# Patient Record
Sex: FEMALE | Employment: FULL TIME | URBAN - METROPOLITAN AREA
[De-identification: names, ages, dates, MRNs, and addresses within clinical notes are randomized per-mention and may not be internally consistent; named-entity substitution may affect disease eponyms.]

---

## 2022-02-24 ENCOUNTER — HOSPITAL ENCOUNTER (OUTPATIENT)
Dept: PHYSICAL THERAPY | Age: 57
Discharge: HOME | End: 2022-02-24
Payer: COMMERCIAL

## 2022-02-24 PROCEDURE — 92507 TX SP LANG VOICE COMM INDIV: CPT

## 2022-02-24 PROCEDURE — 92524 BEHAVRAL QUALIT ANALYS VOICE: CPT

## 2022-02-24 NOTE — PROGRESS NOTES
[facility] [employer] [facility]  [address] [place] [address]  [phone] fax    Plan of Care/ Statement of Necessity for Speech Therapy Services    Patient name: Gris Carver Start of Care: 2022   Referral source: Maribel Meadows MD : 1965    Medical Diagnosis: Dysphonia [R49.0]  Payor: Salvador Watkins / Plan: [employer] / Product Type: HMO /  Onset Date:2021    Treatment Diagnosis: Dysphonia   Prior Hospitalization: see medical history Provider#: [id number]   Medications: Verified on Patient summary List    Comorbidities: Right TVC nodule, hx of hyper/hypothyroidism, GERD, allergies   Prior Level of Function: WNL for speech, language, voice, swallowing, and cognition    The Plan of Care and following information is based on the information from the initial evaluation. Assessment/ key information:     Patient seen this AM for voice evaluation in OP clinic following recent referral from ENT following new diagnosis of vocal hoarseness with right TVC nodule, reflux, and allergies. Patient reports that she started having increased hoarsenss in [place] that gradually worsened. 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 She is also very involved in the community in [employer] and her college sorority. Patient reports hoarseness is worse in AM, during changes in weather, and s/p long conversations. Saw an ENT 21, referred to ST during that time. Patient diagnosed with GERD and nodule on her right TVC. ENT placed patient on reflux medication and Flonase during that time, patient has noticed decline in hoarseness s/p initiation of reflux medication. Patient reports that the Flonase was causing her nose to bleed/feel dry, so she has decreased how often she is using it. Patient was evaluated today through informal and formalized assessment with results as followed.  Patient alert and oriented x4 romero. Intermittent hoarseness noted throughout conversation at the end of long utterances. Patient noted to have reduced breathing/speaking coordination, patient observed speaking beyond breath during conversation, resulting in strained/hoarse/breathy vocal quality during longer conversational utterances. Reduced breath support noted during sustained phonation task. Patient was able to sustain \"ah\" for 4.5 seconds on average (too low for her age/gender, norm is 15-20 seconds) with pitch breaks noted at the end of the utterances and laryngeal tension evident. Patient's s/z ratio was assessed as a clinical indicator of laryngeal pathology. Patient's ratio was 1.94, ratios of 1.4 and above are consistent with laryngeal inefficiency and ratios of 2.0 and greater of vocal fold pathology. Patient's pitch range was formally assessed to be 6 semi-tones (too low), but informally assessed to be within normal limits during conversation. Her modal pitch during conversation was 223.08 Hz and during oral reading was 196.0 Hz. Patient with intermittent increased volume noted across evaluation, patient reports that her  notes that she \"talks loud frequently\" at home. Equipment unavailable for hearing screening during today's evaluation, patient may benefit from having her hearing formally assessed. Patient with frequent throat clearing noted throughout evaluation, would benefit from implementation of vocal hygiene strategies to decrease trauma and increase vocal health.     Consensus Auditory Perceptual Evaluation of Voice (CAPE-V) was administered by this SLP :On a scale of 0 to 100 with 100 equaling the most extreme deviance she attained the following scores: (see attached in paper chart):  Overall Severity: 15/100 inconsistent; mildly deviant, roughness: 5/100 inconsistent mildly deviant , breathiness 6/100 Inconsistent mildly deviant; strain: 17/100 consistent, mildly deviant; pitch (WNL) 0/100consistent; moderately deviant; loudness: (too loud)  17/100 inconsistent; mild-moderately deviant      Patient completed Voice Handicap Index (VHI): (see attached in her paper chart): She score agreement or disagreement for statements that many people have used to describe their voices and the effects of their voices on their lives. It is self scored with score choices 0 through 4. Zero indicates 'never handicapping' and 4 indicating 'always handicapping'. The higher the score the more handicapping. There are 40 points possible for each of the 3 sections and also a total handicapping score of 120 points. For functional she attained a score of 7 points (mildly handicapping); for physical she scored 25 points (severely handicapping); and for emotional she scored 15 points (moderately severely handicapping). She attained a total score of 47 points (moderately  severely handicapping). She rated herself a score of 6  on a 7 point scale ( extremely talkative).     She completed LPR Symptoms Index: See attached in chart. (0= no problem 5=severe problem) She rated the following as 3: hoarseness, clearing your throat. She rated the following as 2: excessive mucous/postnasal drip, sensations of something sticking in throat. She rated the following as 1: difficulty swallowing, breathing difficulty, and heartburn/chest pain/indigestion/stomach acid coming up. From the results indicated above, patient currently presents with a mild dysphonia as c/b intermittent hoarseness, reduced breath support, increased laryngeal tension, and vocal fatigue. Patient would benefit from skilled ST services for dysphonia treatment as it is medically necessary to improve vocal function, reduce laryngeal trauma, increase coordination between breathing/speaking, and increase participation in functional social and work requirements.       Problem List:      []aphasic  []dysarthric  []dysphagic       []alexic  []agraphic  [x]dysphonia       []dysfluency []Cognitive-Linguistic Disorder       []other   Treatment Plan may include any combination of the following: Voice Treatment and Patient Education      Patient / Family readiness to learn indicated by: asking questions, trying to perform skills and interest    Persons(s) to be included in education:   patient (P) and family support person (FSP);list Dr. Ambrocio Woodson (PCP), Dr. Valeriy Ohara (ENT)    Barriers to Learning/Limitations: None    Patient Goal (s): To decrease my hoarseness and reduce my habits that are causing it    Patient Self Reported Health Status: good    Rehabilitation Potential: excellent    Short Term Goals: To be accomplished in 4 weeks  1. Patient will demonstrate good vocal hygiene by  (a) decreasing coughing by ~50% (b) decreasing throat clearing by 50% (c) consume ~ 64 oz of water daily and (d) taking her PPI correctly for best absorption in order to decrease trauma/irritation to the vocal folds to help improve her voice.     2. Patient will use diaphragmatic/abdominal breath management with good connection of airflow to phonation for automatics, words, phrases and simple sentences with 80% acc with min-mod cues during structured phonation/speaking tasks to improve breath support.     3. Patient will perform laryngeal area relaxation and stretching exercises with min cues to reduce vocal tension and carryover for consistent  HEP completion.      4. Patient will demonstrate a more forward placement of tone, easy onsets of phonation and a legato speaking style with adequate loudness (lower), and decreased tension for automatics, words, phrases and short sentences with 70% acc with min-mod cues in order to improve her voice.       5. .  Patient will perform vocal fx exercises (such as Stemple's, vocal entrainment-pitch ex's) with mod A in order to improve the balance between respiration, phonation and resonance and improve vocal strength and flexibility for improved vocal health and voice production    Long Term Goals: To be accomplished in 4 weeks   1. Patient will demonstrate/produce improved voice for sustained vocalization/ connective speech up to the simple conversational level to communicate basic medical and social needs, including decreased tension, increased facial focus, and easier voice and increased vocal stamina with a more appropriate pitch (lower), loudness, and vocal quality 80% of the time for vocal ADL/IADL tasks as measured by objective measurements, SLP assessment and SLP/patient agreement for perceptual judgment. Frequency / Duration: Patient to be seen 1-2 times per week for 4 weeks:    Patient/ Caregiver education and instruction: Diagnosis, prognosis, Compensatory Techniques and Exercises,  Tx initiated to include training in diaphragmatic breathing and vocal hygiene, patient reported understanding    Certification Period: 02/24/22- 03/26/22    KRUNAL Long 2/24/2022 8:12 AM  ________________________________________________________________________    I certify that the above Therapy Services are being furnished while the patient is under my care. I agree with the treatment plan and certify that this therapy is necessary.     [de-identified] Signature:____________Date:_________TIME:________     Jesus Rogers MD  ** Signature, Date and Time must be completed for valid certification **    Please sign and return to [facility]  [address] [place] [address]  [phone] fax     Thank you

## 2022-02-24 NOTE — PROGRESS NOTES
[facility] EVALUATION    Patient Name: Kim Monzon  Date:2022  : 1965  [x]  Patient  Verified  Payor: Jeff Miller / Plan: Erika Tanner / Product Type: HMO /   In time: 900  Out time: 945  Total Treatment Time (min): 45  Visit #: 1 of 8    SUBJECTIVE  Pain Level (0-10 scale): 0  Any medication changes, allergies to medications, adverse drug reactions, diagnosis change, or new procedure performed?: [] No    [] Yes (see summary sheet for update)  Subjective functional status/changes:   [] No changes reported  Patient reports that she started having increased hoarsenss in [place] that gradually worsened. 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 Patient reports hoarseness is worse in AM, during changes in weather, and s/p long conversations. Saw an ENT 21, referred to ST during that time. Patient diagnosed with GERD and nodule on her right TVC. ENT placed patient on reflux medication and Flonase during that time, patient has noticed decline in hoarseness s/p initiation of reflux medication. Patient reports that the Flonase was causing her nose to bleed/feel dry, so she has decreased how often she is using it. Date of Onset: 2021  Social History: Patient talks a lot for her job. Patient lives at home with . Involved in her [employer], post-grad program weekly. Involved in her [employer], speaks occasionally in her [employer].   Prior Functional level: WNL for voice, swallowing, cognition, and language     OBJECTIVE    OUTPATIENT VOICE EVALUATION    Description of Problem: Ongoing hoarseness    Onset of Problem: 2021  Variability through Day: yes, worse in AM and following work hours, with change in weather as well  Voice Usage: work, socially, with family    Know Abuse/Misuse: frequent throat clearing    Pitch:   [x] WNL  [] Low  [] High     Comments:  Loudness: [] WNL  [x] Excessive [] Inadequate     Comments: Occasionally increased volume noted at the simple conversational level  Quality:  [] WNL      Comments: Intermittent hoarseness noted  Resonance: [x] WNL  [] Hypernasal [] Hyponasal     Comments:  Rate:  [] WNL  [x] Fast  [] Slow     Comments: Intermittent fast rate of speaking with reduced breathing/speaking coordination noted, speaking beyond breath  Range:  [x] WNL  [] Montone [] Excessive variability    Comments:  Observations [x] Pitch Breaks [] Glottal Larkin [] Stridency [] Hard Glottal Attacks    [] Aphonia [] Diplophonia [x] Phonation Breaks     [] Severe Fluctuations on Quality    []Other:     [] Aphonia interspersed with intermittent phonation      Vowel prolongation /a/ (a measure of respiratory/phonatory efficiency):  duration:  4.5  seconds;  pitch: 261.63 Hz, Intensity: 74  dB   Vocal quality:  Pitch breaks at the end of the production, vocal strain noted        Excessive tension observed in the neck during the prolongation      S/Z Ratio: S= 6.2 sec  Z= 3.2 sec  S/Z= 1.94    Frequency (as measured by chromatic tuner and keyboard):  Pitch Range:   6 Semi-tones, informally assessed to be WNL, patient with difficulty providing highest and lowest pitch      Lowest: 392 Hz   Highest: 554.37 Hz   Modal pitch in conversation: 223.08 Hz  Modal pitch in readin.0    Is breathing audible? [] Yes [x] No  Is phonation on inhalation? [] Yes [x] No  Is breathing pattern irregular? [] Yes [x] No  Does patient have difficulty sustaining expiration? [] Yes [x] No  Does patient focus on breathing? [] Yes [x] No  Clavicular breathing? [] Yes [x] No  Reduced respiratory/speech coordination? [x] Yes [] No    Frequent coughing? [] Yes [x] No   Frequent throat clearing? [x] Yes [] No   Extrinsic laryngeal tension? [x] Yes [] No  Visible tension present: []neck  [] chest  [] mandible  Evidence of tight throat during phonation? [] Yes [] No  Complaints of tired throat?  [x] Yes [] No  Tone focus: []retracted tongue  [] closed mouth   []appropriate [x] back throat focus    Perceptual assessment:  Consensus Auditory Perceptual Evaluation of Voice (CAPE-V) was administered by this SLP :On a scale of 0 to 100 with 100 equaling the most extreme deviance she attained the following scores: (see attached in paper chart):  Overall Severity: 15/100 inconsistent; mildly deviant, roughness: 5/100 inconsistent mildly deviant , breathiness 6/100 Inconsistent mildly deviant; strain: 17/100 consistent, mildly deviant; pitch (WNL) 0/100consistent;  moderately deviant; loudness: (too loud)  17/100 inconsistent; mild-moderately deviant      Patient completed Voice Handicap Index (VHI): (see attached in her paper chart): She score agreement or disagreement for statements that many people have used to describe their voices and the effects of their voices on their lives. It is self scored with score choices 0 through 4. Zero indicates 'never handicapping' and 4 indicating 'always handicapping'. The higher the score the more handicapping. There are 40 points possible for each of the 3 sections and also a total handicapping score of 120 points. For functional she attained a score of 7 points (mildly handicapping); for physical she scored 25 points (severely handicapping); and for emotional she scored 15 points (moderately severely handicapping). She attained a total score of 47 points (moderately  severely handicapping). She rated herself a score of 6  on a 7 point scale ( extremely talkative). She completed LPR Symptoms Index: See attached in chart. (0= no problem 5=severe problem) She rated the following as 3: hoarseness, clearing your throat. She rated the following as 2: excessive mucous/postnasal drip, sensations of something sticking in throat. She rated the following as 1: difficulty swallowing, breathing difficulty, and heartburn/chest pain/indigestion/stomach acid coming up.       Speech:   Oral Verbal Apraxia: [x] None     [] Mild [] Moderate [] Severe   Dysarthria:  [x] None     [] Mild [] Moderate [] Severe   Intelligibility  [x] WNL      [] Words [] Phrases [] Sentences       [] Conversation  % intelligible:   Agrammatic:  [] Yes       [x] No  Hearing screened by: Has not had a recent hearing screening, equipment not present during evaluation, would benefit from f/u hearing screening d/t intermittent increased volume, discussed with patient   Passed [] Yes [] No  Comments:   Fluency:  [x]WNL  []Dysfluent   Comments:  Motor Speech:  [x]Articulation [facility]    []Apraxia  []oral []verbal  ( []min []mod []severe)    []Dysarthria    Intelligibility:  Deviations noted:  [x]none  []yes, describe: Auditory Comprehension: [x] WFL [] Impaired - describe: (subjective)  Verbal Expression: [x] WFL [] Impaired - describe: (subjective)  Reading comprehension: [x] WFL [] Impaired - describe: (subjective)  Cognitive skills: [x] WFL [] Impaired - describe: (subjective)      Patient/Caregiver instruction/education: [x] Review HEP    [] Progressed/Changed    HEP/Handouts given: Vocal hygiene guidelines, diaphragmatic breathing handout    Pain Level (0-10 scale) post treatment: 0    ASSESSMENT  []  See Plan of Care    Short Term Goals: To be accomplished in 4 weeks  1. Patient will demonstrate good vocal hygiene by  (a) decreasing coughing by ~50% (b) decreasing throat clearing by 50% (c) consume ~ 64 oz of water daily and (d) taking her PPI correctly for best absorption in order to decrease trauma/irritation to the vocal folds to help improve her voice. 2. Patient will use diaphragmatic/abdominal breath management with good connection of airflow to phonation for automatics, words, phrases and simple sentences with 80% acc with min-mod cues during structured phonation/speaking tasks to improve breath support.     3. Patient will perform laryngeal area relaxation and stretching exercises with min cues to reduce vocal tension and carryover for consistent  HEP completion. 4. Patient will demonstrate a more forward placement of tone, easy onsets of phonation and a legato speaking style with adequate loudness (lower), and decreased tension for automatics, words, phrases and short sentences with 70% acc with min-mod cues in order to improve her voice. 5..  Patient will perform vocal fx exercises (such as Stemple's, vocal entrainment-pitch ex's) with mod A in order to improve the balance between respiration, phonation and resonance and improve vocal strength and flexibility for improved vocal health and voice production. Long Term Goals: To be accomplished in 4 weeks     1. Patient will demonstrate/produce improved voice for sustained vocalization/ connective speech up to the simple conversational level to communicate basic medical and social needs, including decreased tension, increased facial focus, and easier voice and increased vocal stamina with a more appropriate pitch (lower), loudness, and vocal quality 80% of the time for vocal ADL/IADL tasks as measured by objective measurements, SLP assessment and SLP/patient agreement for perceptual judgment.          PLAN  [x]  Upgrade activities as tolerated     [x]  Continue plan of care  []  Discharge due to:__  [x] Other: Tx initiated to include training in diaphragmatic breathing and vocal hygiene guidelines with handouts, patient reported understanding    Cassy Myers SLP 2/24/2022  8:11 AM

## 2022-03-02 ENCOUNTER — HOSPITAL ENCOUNTER (OUTPATIENT)
Dept: PHYSICAL THERAPY | Age: 57
Discharge: HOME | End: 2022-03-02
Payer: COMMERCIAL

## 2022-03-02 PROCEDURE — 92507 TX SP LANG VOICE COMM INDIV: CPT

## 2022-03-02 NOTE — PROGRESS NOTES
ST DAILY TREATMENT NOTE    Patient Name: Radha Arriola  Date:3/2/2022  : 1965  [x]  Patient  Verified  Payor: Payor: Indira Panda / Plan: Robert Hernandez / Product Type: HMO /   In time: 600 Out time: 518  Total Treatment Time (min): 45  Visit #: 2 of 8     Treatment Diagnosis: Dysphonia [R49.0]    SUBJECTIVE  Pain Level (0-10 scale): 0  Any medication changes, allergies to medications, adverse drug reactions, diagnosis change, or new procedure performed?: [] No    [] Yes (see summary sheet for update)    Subjective functional status/changes:   [x] No changes reported   Patient was motivated and engaged throughout the session. She reported that on a scale of 0-10 with 10 being fully recovered, her voice was at an 8    OBJECTIVE  Treatment provided includes:  Increase/Improve:  [x]  Voice Quality []  Cognitive Linguistic Skills []  Laryngeal/Pharyngeal Exercises   []  Vocal Loudness []  Reading Comprehension []  Swallowing Skills    [x]  Vocal Cord Function []  Auditory Comprehension []  Oral Motor Skills   []  Resonance []  Writing Skills []  Compensatory strategies    []  Speech Intelligibility []  Expressive Language []  Attention   []  Breath Support/Coord. []  Receptive language []  Memory   []  Articulation []  Safety Awareness []    []  Fluency []  Word Retrieval []        Treatment Provided:    Patient/Caregiver  Education: [x] Review HEP      HEP/Handouts given: Vocal hygiene, laryngeal stretches, cough suppression    Pain Level (0-10 scale) post treatment:     ASSESSMENT     [x]   Improving appropriately and progressing toward goals  []   Improving slowly and progressing toward goals  []   Approximating goals/maximum potential  [x]   Continues to benefit from skilled therapy to address remaining functional deficits  []   Not progressing toward goals and plan of care will be adjusted    Patient presented with overall good vocal quality at discourse level, with some breaks noted.  Patient reported often speaking beyond breath, particularly when nervous. ST provided education on vocal hygiene, diaphragmatic breathing, stretches, and cough/throat clear suppression. Patient verbalized understanding. Patient performed stretches with guided model with 100% acc. She performed vocal function exercises with respiratory baseline of 13 seconds. Progress towards goals / Updated goals:  throat clearing by 50% (c) consume ~ 64 oz of water daily and (d) taking her PPI correctly for best absorption in order to decrease trauma/irritation to the vocal folds to help improve her voice. 3/2/22: Initiated. Patient reported drinking water, giving up soda, and trying to self monitor nonproductive throat clearing and coughing     2. Patient will use diaphragmatic/abdominal breath management with good connection of airflow to phonation for automatics, words, phrases and simple sentences with 80% acc with min-mod cues during structured phonation/speaking tasks to improve breath support.   3/2/22: Patient performed diaphragmatic breathing in isolation with 70% acc given cues and model  3. Patient will perform laryngeal area relaxation and stretching exercises with min cues to reduce vocal tension and carryover for consistent  HEP completion.   3/2/22: Patient performed stretches with model     4. Patient will demonstrate a more forward placement of tone, easy onsets of phonation and a legato speaking style with adequate loudness (lower), and decreased tension for automatics, words, phrases and short sentences with 70% acc with min-mod cues in order to improve her voice.       5. Anton Quintanilla will perform vocal fx exercises (such as Stemple's, vocal entrainment-pitch ex's) with mod A in order to improve the balance between respiration, phonation and resonance and improve vocal strength and flexibility for improved vocal health and voice production  Stemple's  3/2/22  /s/: 13  /I/: 9.5  C: 5.2  D:3.6  E:2.5  F:2.9       PLAN  [x] Continue plan of care  []  Modify Goals/Treatment Plan      []  Discharge due to:  [] Other:    Reema Medina, KRUNAL 3/2/2022  5:58 PM    Future Appointments   Date Time Provider Arik Solomon   3/2/2022  6:00 PM [address], KRUNAL [facility]   3/7/2022  8:15 AM Iggy Aleman[address], SLP [facility]   3/14/2022  8:15 AM Iggy Aleman [address] [place] [address], SLP [facility]   3/21/2022  8:15 AM Iggy Aleman [address] [place] [address], SLP [facility]   3/28/2022  8:15 AM Iggy Aleman [address] [place] [address], SLP [facility]

## 2022-03-07 ENCOUNTER — HOSPITAL ENCOUNTER (OUTPATIENT)
Dept: PHYSICAL THERAPY | Age: 57
Discharge: HOME | End: 2022-03-07
Payer: COMMERCIAL

## 2022-03-07 PROCEDURE — 92507 TX SP LANG VOICE COMM INDIV: CPT

## 2022-03-07 NOTE — PROGRESS NOTES
ST DAILY TREATMENT NOTE    Patient Name: Orma Kussmaul  Date:3/7/2022  : 1965  [x]  Patient  Verified  Payor: Payor: Jenny Farley / Plan: Devendra Ambrosio / Product Type: HMO /   In time: 36 Out time: 900  Total Treatment Time (min): 40  Visit #: 3 of 8     Treatment Diagnosis: Dysphonia [R49.0]    SUBJECTIVE  Pain Level (0-10 scale): 0  Any medication changes, allergies to medications, adverse drug reactions, diagnosis change, or new procedure performed?: [] No    [] Yes (see summary sheet for update)    Subjective functional status/changes:   [x] No changes reported   Patient was motivated and engaged throughout the session. OBJECTIVE  Treatment provided includes:  Increase/Improve:  [x]  Voice Quality []  Cognitive Linguistic Skills []  Laryngeal/Pharyngeal Exercises   []  Vocal Loudness []  Reading Comprehension []  Swallowing Skills    [x]  Vocal Cord Function []  Auditory Comprehension []  Oral Motor Skills   []  Resonance []  Writing Skills []  Compensatory strategies    []  Speech Intelligibility []  Expressive Language []  Attention   []  Breath Support/Coord. []  Receptive language []  Memory   []  Articulation []  Safety Awareness []    []  Fluency []  Word Retrieval []        Treatment Provided:    Patient/Caregiver  Education: [x] Review HEP      HEP/Handouts given: Vocal hygiene, laryngeal stretches, cough suppression    Pain Level (0-10 scale) post treatment:     ASSESSMENT     [x]   Improving appropriately and progressing toward goals  []   Improving slowly and progressing toward goals  []   Approximating goals/maximum potential  [x]   Continues to benefit from skilled therapy to address remaining functional deficits  []   Not progressing toward goals and plan of care will be adjusted    Patient presented presented with improved vocal quality at the discourse level and was also able to increase her times during vowel prolongation in Stemple's exercises.  She reported adherence to HEP and that she was trying to suppress nonproductive cough and throat clear. Nonproductive throat clear observed ~2x during session, but patient self cued/monitored when it happened. Mod cues needed for posture and breath support during exercises. Progress towards goals / Updated goals:  throat clearing by 50% (c) consume ~ 64 oz of water daily and (d) taking her PPI correctly for best absorption in order to decrease trauma/irritation to the vocal folds to help improve her voice. 3/7/22: Patient reported throat clear/cough suppression, limiting volume in noisy environments and completing exercises and stretches     2. Patient will use diaphragmatic/abdominal breath management with good connection of airflow to phonation for automatics, words, phrases and simple sentences with 80% acc with min-mod cues during structured phonation/speaking tasks to improve breath support.   3//722: Patient performed diaphragmatic breathing in isolation with ~70% acc given cues and model  3. Patient will perform laryngeal area relaxation and stretching exercises with min cues to reduce vocal tension and carryover for consistent  HEP completion.   3/7/22: Patient performed stretches with model     4. Patient will demonstrate a more forward placement of tone, easy onsets of phonation and a legato speaking style with adequate loudness (lower), and decreased tension for automatics, words, phrases and short sentences with 70% acc with min-mod cues in order to improve her voice.       5. Olga Grimes will perform vocal fx exercises (such as Stemple's, vocal entrainment-pitch ex's) with mod A in order to improve the balance between respiration, phonation and resonance and improve vocal strength and flexibility for improved vocal health and voice production  Stemple's  3/7/22  /s/: 13  /I/: 9.1  C: 5.5  D: 4.3  E: 5   F: 3.7       PLAN  [x]  Continue plan of care  []  Modify Goals/Treatment Plan      []  Discharge due to:  [] Other:    Rosalba Darling [address], [address] 3/7/2022  5:58 PM    Future Appointments   Date Time Provider Arik Solomon   3/14/2022  8:15 AM [address] SLP [facility]   3/21/2022  8:15  [address] SLP [facility]   3/28/2022  8:15  [address] S[address] [facility]

## 2022-03-14 ENCOUNTER — HOSPITAL ENCOUNTER (OUTPATIENT)
Dept: PHYSICAL THERAPY | Age: 57
Discharge: HOME | End: 2022-03-14
Payer: COMMERCIAL

## 2022-03-14 PROCEDURE — 92507 TX SP LANG VOICE COMM INDIV: CPT

## 2022-03-14 NOTE — PROGRESS NOTES
ST DAILY TREATMENT NOTE    Patient Name: Best Mireles  Date:3/14/2022  : 1965  [x]  Patient  Verified  Payor: Payor: Ryan Andrade / Plan: Alejandra Smith / Product Type: HMO /   In time: 36 Out time: 900  Total Treatment Time (min): 40  Visit #: 4 of 8     Treatment Diagnosis: Dysphonia [R49.0]    SUBJECTIVE  Pain Level (0-10 scale): 0  Any medication changes, allergies to medications, adverse drug reactions, diagnosis change, or new procedure performed?: [] No    [] Yes (see summary sheet for update)    Subjective functional status/changes:   [x] No changes reported   Patient was motivated and engaged throughout the session. OBJECTIVE  Treatment provided includes:  Increase/Improve:  [x]  Voice Quality []  Cognitive Linguistic Skills []  Laryngeal/Pharyngeal Exercises   []  Vocal Loudness []  Reading Comprehension []  Swallowing Skills    [x]  Vocal Cord Function []  Auditory Comprehension []  Oral Motor Skills   []  Resonance []  Writing Skills []  Compensatory strategies    []  Speech Intelligibility []  Expressive Language []  Attention   []  Breath Support/Coord. []  Receptive language []  Memory   []  Articulation []  Safety Awareness []    []  Fluency []  Word Retrieval []        Treatment Provided:    Patient/Caregiver  Education: [x] Review HEP      HEP/Handouts given: Vocal hygiene, laryngeal stretches, cough suppression    Pain Level (0-10 scale) post treatment:     ASSESSMENT     [x]   Improving appropriately and progressing toward goals  []   Improving slowly and progressing toward goals  []   Approximating goals/maximum potential  [x]   Continues to benefit from skilled therapy to address remaining functional deficits  []   Not progressing toward goals and plan of care will be adjusted    Patient continues to present with increased vocal clarity. She performed laryngeal stretches with 100% acc and reported that she felt better afterwards.  She demonstrated diaphragmatic breathing  With ~80% accuracy given models and max verbal cues. She had some difficulty with sustained phonation at the prolonged vowel level during vocal function exercises and required max cues/models. Progress towards goals / Updated goals:  throat clearing by 50% (c) consume ~ 64 oz of water daily and (d) taking her PPI correctly for best absorption in order to decrease trauma/irritation to the vocal folds to help improve her voice. 3/14/22: patient reported drinking ~24 oz water. She was observed to clear her throat 2x during session, but reported being mindful of it at home     2. Patient will use diaphragmatic/abdominal breath management with good connection of airflow to phonation for automatics, words, phrases and simple sentences with 80% acc with min-mod cues during structured phonation/speaking tasks to improve breath support.   3//1422: Patient performed diaphragmatic breathing in isolation with ~70% acc given cues and model    3. Patient will perform laryngeal area relaxation and stretching exercises with min cues to reduce vocal tension and carryover for consistent  HEP completion.   3/14/22: Patient performed stretches with model     4. Patient will demonstrate a more forward placement of tone, easy onsets of phonation and a legato speaking style with adequate loudness (lower), and decreased tension for automatics, words, phrases and short sentences with 70% acc with min-mod cues in order to improve her voice.       5. Shala Larose will perform vocal fx exercises (such as Stemple's, vocal entrainment-pitch ex's) with mod A in order to improve the balance between respiration, phonation and resonance and improve vocal strength and flexibility for improved vocal health and voice production  Stemple's  3/14/22  /s/: 9  /I/: 6.5  C: 4.6   D: 4.8  E: 3.8  F: 4.1      PLAN  [x]  Continue plan of care  []  Modify Goals/Treatment Plan      []  Discharge due to:  [] Other:    KURNAL Aquino 3/14/2022  5:58 PM    Future Appointments   Date Time Provider Arik Solomon   3/21/2022  8:15 AM Tom Xie, SLP [facility]   3/28/2022  8:15 AM Tom Xie, [facility]

## 2022-03-21 ENCOUNTER — TELEPHONE (OUTPATIENT)
Dept: PHYSICAL THERAPY | Age: 57
End: 2022-03-21

## 2022-03-21 ENCOUNTER — APPOINTMENT (OUTPATIENT)
Dept: PHYSICAL THERAPY | Age: 57
End: 2022-03-21
Payer: COMMERCIAL

## 2022-03-28 ENCOUNTER — APPOINTMENT (OUTPATIENT)
Dept: PHYSICAL THERAPY | Age: 57
End: 2022-03-28
Payer: COMMERCIAL

## 2022-04-04 ENCOUNTER — APPOINTMENT (OUTPATIENT)
Dept: PHYSICAL THERAPY | Age: 57
End: 2022-04-04